# Patient Record
Sex: MALE | Race: WHITE | NOT HISPANIC OR LATINO | URBAN - METROPOLITAN AREA
[De-identification: names, ages, dates, MRNs, and addresses within clinical notes are randomized per-mention and may not be internally consistent; named-entity substitution may affect disease eponyms.]

---

## 2020-01-28 ENCOUNTER — EMERGENCY (EMERGENCY)
Facility: HOSPITAL | Age: 45
LOS: 1 days | Discharge: HOME | End: 2020-01-28
Attending: EMERGENCY MEDICINE
Payer: MEDICARE

## 2020-01-28 VITALS
TEMPERATURE: 98 F | SYSTOLIC BLOOD PRESSURE: 130 MMHG | RESPIRATION RATE: 20 BRPM | HEART RATE: 89 BPM | DIASTOLIC BLOOD PRESSURE: 79 MMHG | OXYGEN SATURATION: 100 %

## 2020-01-28 VITALS — HEIGHT: 68 IN | WEIGHT: 199.96 LBS

## 2020-01-28 DIAGNOSIS — F20.0 PARANOID SCHIZOPHRENIA: ICD-10-CM

## 2020-01-28 LAB
APPEARANCE UR: CLEAR — SIGNIFICANT CHANGE UP
BILIRUB UR-MCNC: NEGATIVE — SIGNIFICANT CHANGE UP
COLOR SPEC: YELLOW — SIGNIFICANT CHANGE UP
DIFF PNL FLD: ABNORMAL
GLUCOSE UR QL: NEGATIVE MG/DL — SIGNIFICANT CHANGE UP
KETONES UR-MCNC: NEGATIVE — SIGNIFICANT CHANGE UP
LEUKOCYTE ESTERASE UR-ACNC: ABNORMAL
NITRITE UR-MCNC: NEGATIVE — SIGNIFICANT CHANGE UP
PCP SPEC-MCNC: SIGNIFICANT CHANGE UP
PH UR: 7 — SIGNIFICANT CHANGE UP (ref 5–8)
PROT UR-MCNC: 15 MG/DL
SP GR SPEC: 1 — LOW (ref 1.01–1.02)
UROBILINOGEN FLD QL: 1 MG/DL

## 2020-01-28 PROCEDURE — 81001 URINALYSIS AUTO W/SCOPE: CPT

## 2020-01-28 PROCEDURE — 90792 PSYCH DIAG EVAL W/MED SRVCS: CPT

## 2020-01-28 PROCEDURE — 99284 EMERGENCY DEPT VISIT MOD MDM: CPT

## 2020-01-28 PROCEDURE — 80307 DRUG TEST PRSMV CHEM ANLYZR: CPT

## 2020-01-28 PROCEDURE — 82962 GLUCOSE BLOOD TEST: CPT

## 2020-01-28 NOTE — ED BEHAVIORAL HEALTH ASSESSMENT NOTE - SUMMARY
Patient is a 44  year old, male; lives alone; single; never , noncaregiver; employed part time as a  over 20 years; past psychiatric history of schizophrenia; w/ one prior psychiatric  hospitalization when he had his first break (at around 16-18),  no known suicide attempts; no known history of violence or arrests; no active substance abuse or known history of complicated withdrawal; PMH of recent dx of DM following episode of pancreatitis; brought in by EMS; accompanied by  for work; presenting with odd behavior.  Patient was sent for evaluation for muttering to himself appearing agitated and anxious. Recently patient was dx with DM and is at lower dosage of Clozaril for last two months. Patient is not aggressive or violent, denies any S/H I/I/P, currently guarded and anxious appearing.   Likely showing signs of early decompensation.  Patient has good support, and does not want to be hospitalized.  Brother who knows patient well has noticed patient to be more anxious over the last two weeks but has no safety concerns and reports patient is compliant with tx.. Brother plans to stay with patient tonight and accompany patient to psychiatry appointment tomorrow. There are no current signs of dangerous behavior. Will clear for outpatient follow up. Patient and brother both know to return to ER if conditions worsen.

## 2020-01-28 NOTE — ED BEHAVIORAL HEALTH ASSESSMENT NOTE - DESCRIPTION
Patient does appear anxious with some hand wringing and shaking of legs. He is guarded. He did not appear to be in physical distress. He consistently denied any S/H I/I/P. He was not aggressive or threatening towards others.  He declined medications for anxiety. He stated that he would take his medications when he got home and follow his doctor's advice. h/o pancreatitis, DM Patient with two brothers, raised by parents

## 2020-01-28 NOTE — ED PROVIDER NOTE - OBJECTIVE STATEMENT
This patient is a 44 year old man who presents to the ER with his co-worker for bizarre behavior.  Co-worker states that the patient works as a  at the school.  She believes he may have a history of mental illness.  Patient usually starts work around 4.  She saw him at 4:30 wandering and pacing in the [place] and did not seem like himself.  She has never seen him in this state.  EMS was called.  Patient's brother also called.  Patient has complaints.  He denies hallucinations, suicidal and homicidal ideations, as well as alcohol and drug use.  Patient's brother called stating patient has history diabetes and he was started on medication a few weeks ago.  He also has a history of paranoid schizophrenia.  When his brother arrived to the ER his brother reported that patient seemed more agitated and "all over the place."

## 2020-01-28 NOTE — ED PROVIDER NOTE - PATIENT PORTAL LINK FT
You can access the FollowMyHealth Patient Portal offered by [facility] by registering at the following website: [email or web address]. By joining [employer]’s FollowMyHealth portal, you will also be able to view your health information using other applications (apps) compatible with our system.

## 2020-01-28 NOTE — ED ADULT NURSE NOTE - NSIMPLEMENTINTERV_GEN_ALL_ED
Implemented All Universal Safety Interventions:  [place] to call system. Call bell, personal items and telephone within reach. Instruct patient to call for assistance. Room bathroom lighting operational. Non-slip footwear when patient is off stretcher. Physically safe environment: no spills, clutter or unnecessary equipment. Stretcher in lowest position, wheels locked, appropriate side rails in place.

## 2020-01-28 NOTE — ED BEHAVIORAL HEALTH ASSESSMENT NOTE - HPI (INCLUDE ILLNESS QUALITY, SEVERITY, DURATION, TIMING, CONTEXT, MODIFYING FACTORS, ASSOCIATED SIGNS AND SYMPTOMS)
Patient is a 44  year old, male; lives alone; single; never , noncaregiver; employed part time as a  over 20 years; past psychiatri history of schizophrenia; w/ one prior psychiatric  hospitalization when he had his first break (at around 16-18),  no known suicide attempts; no known history of violence or arrests; no active substance abuse or known history of complicated withdrawal; PMH of recent dx of DM following episode of pancreatitis; brought in by EMS; accompanied by  for work; presenting with odd behavior.       Patient follows up w/ Dr. Ragsdale at [facility] and has appointment tomorrow.  At baseline is calm,  and he has no h/o aggression, or violence.  Patient A&Ox4, agitated, anxious, pacing, denies any pain or discomfort. As per coworker, patient was at work when coworkers noticed a change in his behavior. Stated he was walking around aimlessly, mumbling to himself.  Patient with brother in ER who was able to provide additional history.       Patient has been stable for many years. He was taken off all his medications two months ago after bout of pancreatitis and new onset of diabetes.  He was then restarted on his medications but Clozaril is at a lower dose than in the past (he was taken 900 mg and now 200 mg).  Brother reports he sees patient weekly and has more frequent contact with him on phone.  He noted patient to be more fidgety than usual starting one week ago. He says that Aunt noted patient to be more anxious the last couple of days.       Patient admits that he was anxious but is otherwise minimizing problems and states that people were wrong and that he is doing fine.  He stated "there was a moment I got anxious".  When asked if he had any suicidal thoughts patient stated "not a one". When asked about any homicidal or aggressive ideation patient stated " I would not even think to do that". He is minimizing or denying any symptoms. He denies hearing voices or seeing things. No clear delusions were elicited.  He is able to corroborate history. He states that he plans to follow up with psychiatrist tomorrow and psychiatrist recommends increasing medications that he will do so. He reports that he wants to go home and take his medications. He reports he has been compliant with them.  He denies depressed mood. He denies disturbance in sleep. He denies euphoria, irritability or decreased need for sleep. He does feel it is a mistake he is here.       Brother corroborates that patient is not aggressive and he does not feel patient is a danger to himself or others.  He does feel that patient is starting to decompensate.  He feels comfortable with discharge back home. He reports that he will stay with patient tonight and accompany patient to appointment tomorrow. He states that he would bring patient back to ER if patient's condition worsens. He does not know patient to act in an impulsive or dangerous manner and feels that patient would follow with his psychiatrist's recommendations.

## 2020-01-28 NOTE — ED ADULT NURSE REASSESSMENT NOTE - NS ED NURSE REASSESS COMMENT FT1
spoke with dr hager blood work to be cancelled.  awaiting d/c papers
pt remains sitting at day table consult down by dr maynard.   pt remains anxious medication offered  pt declining.   stating he will go home and take his usual medication and f/up with appt he has with his doctor.

## 2020-01-28 NOTE — ED BEHAVIORAL HEALTH ASSESSMENT NOTE - SUICIDE PROTECTIVE FACTORS
Engaged in work or school/Positive therapeutic relationships/Has future plans/Supportive social network of family or friends

## 2020-01-28 NOTE — ED ADULT TRIAGE NOTE - CHIEF COMPLAINT QUOTE
Patient A&Ox4, agitated, anxious, pacing, denies any pain or discomfort. As per coworker, patient was at work when coworkers noticed a change in his behavior. Stated he was walking around aimlessly, mumbling to himself. EMS stated patients brother is on his way to ED & notified them that this is not his typical behavior. Denies any SI/HI, denies any illicit drug or alcohol use, denies any auditory or visual hallucinations. Patient refused vitals, Dr. Dumont at bedside for immediate evaluation.

## 2020-01-28 NOTE — ED ADULT NURSE REASSESSMENT NOTE - NSIMPLEMENTINTERV_GEN_ALL_ED
Implemented All Fall Risk Interventions:  [place] to call system. Call bell, personal items and telephone within reach. Instruct patient to call for assistance. Room bathroom lighting operational. Non-slip footwear when patient is off stretcher. Physically safe environment: no spills, clutter or unnecessary equipment. Stretcher in lowest position, wheels locked, appropriate side rails in place. Provide visual cue, wrist band, yellow gown, etc. Monitor gait and stability. Monitor for mental status changes and reorient to person, place, and time. Review medications for side effects contributing to fall risk. Reinforce activity limits and safety measures with patient and family.

## 2020-01-28 NOTE — ED ADULT NURSE REASSESSMENT NOTE - DESCRIPTION
received pt in street clothing pt anxious guarded arrives with security on stretcher ambulatory into intake room.  pt standing in room pacing little unable to sit.  not answering questions not giving hx.  just stating this is all a mistake I don't need to be here it is a mis understanding my co  worker said something. I am not a harm to myself. I am under control and calm.  pt with frequent gasps of breath fist clenched at times.  when asked hx hesitated and no reply. same with medication.  when asked if he had med hx.  didn't offer info when asked if he had certain dx admitted to dm.  . pt required encouragement  to enter unit and to change into gown.  repeating this is all a mis understanding I have a job, I work.  pt escorted to day room.  po fluid and meal offered somewhat calmer accepting meal and water. eat very small portion. drinking water awaiting consult

## 2020-01-28 NOTE — ED PROVIDER NOTE - PROGRESS NOTE DETAILS
Patient cleared by psych.  He will go home with his brother.  Patient has an appointment with his psychiatrist tomorrow.

## 2020-01-28 NOTE — ED BEHAVIORAL HEALTH ASSESSMENT NOTE - RISK ASSESSMENT
Patient has long h/o stability, no prior h/o violence, no aggression, no S/H I/I/P, no h/o prior attempts, has good support, has been compliant with treatment, some psychomotor agitation and is guarded, has appointment with psychiatrist tomorrow, denies panic attacks, denies global insomnia/. Low Acute Suicide Risk

## 2020-01-28 NOTE — ED PROVIDER NOTE - CLINICAL SUMMARY MEDICAL DECISION MAKING FREE TEXT BOX
44 year old man with schizoprenia with reports of bizarre behavior.  Patient pacing not forthcoming with information reported to he not himself as per family.  No hypoglycemia or hyperglycemia noted on finger stick.  Benign physical exam.  Patient seen and cleared by psych.

## 2020-01-28 NOTE — ED BEHAVIORAL HEALTH ASSESSMENT NOTE - OTHER PAST PSYCHIATRIC HISTORY (INCLUDE DETAILS REGARDING ONSET, COURSE OF ILLNESS, INPATIENT/OUTPATIENT TREATMENT)
Long h/o schizophrenia. One prior psychiatric hospitalization when patient had first break at age 16-17.  No known suicide attempts. Currently in outpatient treatment.

## 2023-03-12 NOTE — ED ADULT TRIAGE NOTE - NS ED NURSE AMBULANCES
[facility] PAST SURGICAL HISTORY:  No significant past surgical history

## 2023-03-29 NOTE — ED BEHAVIORAL HEALTH ASSESSMENT NOTE - NS ED BHA MSE SPEECH VOLUME
Incoming fax: Refill Intermittent coude catheters and gloves. Dx: r33.9, n31.9, G82.20--awaiting signature from provider.   Normal